# Patient Record
Sex: FEMALE | HISPANIC OR LATINO | Employment: FULL TIME | URBAN - METROPOLITAN AREA
[De-identification: names, ages, dates, MRNs, and addresses within clinical notes are randomized per-mention and may not be internally consistent; named-entity substitution may affect disease eponyms.]

---

## 2017-04-14 ENCOUNTER — HOSPITAL ENCOUNTER (OUTPATIENT)
Dept: RADIOLOGY | Facility: MEDICAL CENTER | Age: 62
End: 2017-04-14
Attending: FAMILY MEDICINE
Payer: COMMERCIAL

## 2017-04-14 DIAGNOSIS — Z12.31 SCREENING MAMMOGRAM, ENCOUNTER FOR: ICD-10-CM

## 2017-04-14 PROCEDURE — G0202 SCR MAMMO BI INCL CAD: HCPCS

## 2017-05-11 ENCOUNTER — HOSPITAL ENCOUNTER (OUTPATIENT)
Dept: RADIOLOGY | Facility: MEDICAL CENTER | Age: 62
End: 2017-05-11
Attending: FAMILY MEDICINE
Payer: COMMERCIAL

## 2017-05-11 DIAGNOSIS — R92.8 ABNORMAL MAMMOGRAM OF RIGHT BREAST: ICD-10-CM

## 2017-05-11 PROCEDURE — G0206 DX MAMMO INCL CAD UNI: HCPCS | Mod: RT

## 2020-03-29 ENCOUNTER — OFFICE VISIT (OUTPATIENT)
Dept: URGENT CARE | Facility: PHYSICIAN GROUP | Age: 65
End: 2020-03-29
Payer: COMMERCIAL

## 2020-03-29 VITALS
BODY MASS INDEX: 27.09 KG/M2 | OXYGEN SATURATION: 96 % | RESPIRATION RATE: 18 BRPM | HEIGHT: 60 IN | HEART RATE: 64 BPM | DIASTOLIC BLOOD PRESSURE: 70 MMHG | SYSTOLIC BLOOD PRESSURE: 112 MMHG | WEIGHT: 138 LBS | TEMPERATURE: 97.8 F

## 2020-03-29 DIAGNOSIS — M79.641 BILATERAL HAND PAIN: ICD-10-CM

## 2020-03-29 DIAGNOSIS — M79.642 BILATERAL HAND PAIN: ICD-10-CM

## 2020-03-29 DIAGNOSIS — D17.22 LIPOMA OF LEFT UPPER EXTREMITY: ICD-10-CM

## 2020-03-29 PROCEDURE — 99213 OFFICE O/P EST LOW 20 MIN: CPT | Performed by: NURSE PRACTITIONER

## 2020-03-29 ASSESSMENT — ENCOUNTER SYMPTOMS
MEMORY LOSS: 0
CHILLS: 0
SORE THROAT: 0
CONSTIPATION: 0
HEADACHES: 0
DIARRHEA: 0
NAUSEA: 0
BACK PAIN: 0
PALPITATIONS: 0
COUGH: 0
DIZZINESS: 0
SHORTNESS OF BREATH: 0
VOMITING: 0
ORTHOPNEA: 0
FEVER: 0
HEARTBURN: 0
WHEEZING: 0
MYALGIAS: 0
TINGLING: 0

## 2020-03-29 NOTE — LETTER
March 29, 2020       Patient: Sarina Ferreira   YOB: 1955   Date of Visit: 3/29/2020         To Whom It May Concern:    It is my medical opinion that Sarina Ferreira may be excused from work for the date of 3/29/2020.    If you have any questions or concerns, please don't hesitate to call [phone]          Sincerely,          Nadiya Mederos, TIANA.P.R.N.  Electronically Signed

## 2020-03-29 NOTE — PROGRESS NOTES
"Subjective:      Sarina Ferreira is a 64 y.o. female who presents with Hand Problem (both hands [id number], sometimes hurts into elbows & shoulders)            Patient comes in with c/o bilateral hand pain that began approximately one month ago.  She describes the pain as aching. It is usually mild but she reports it has been worsening over last week. She states the pain is 4/10 at its worst. It does radiate to her wrists and elbows as well as occasionally her shoulders. She works an [employer] in housekeeping.  She does not have known history of arthritis. She does report occasional weakness in her hands.  She denies any redness, swelling, fevers, weight loss. She has tried Aleve and this did provide mild improvement.  She does not have a PCP.      Patient also reports two large \"bumps\" on her left shoulder. These developed over last ten years.  Denies pain, redness or injury.       Review of Systems   Constitutional: Negative for chills and fever.   HENT: Negative for ear pain and sore throat.    Respiratory: Negative for cough, shortness of breath and wheezing.    Cardiovascular: Negative for chest pain, palpitations, orthopnea and leg swelling.   Gastrointestinal: Negative for constipation, diarrhea, heartburn, nausea and vomiting.   Musculoskeletal: Positive for joint pain. Negative for back pain and myalgias.   Skin: Negative for rash.        Two large lumps on left shoulder   Neurological: Negative for dizziness, tingling and headaches.   Psychiatric/Behavioral: Negative for memory loss and suicidal ideas.   All other systems reviewed and are negative.         Objective:     /70 (BP Location: Left arm, Patient Position: Sitting, BP Cuff Size: Adult)   Pulse 64   Temp 36.6 °C (97.8 °F) (Temporal)   Resp 18   Ht 1.524 m (5')   Wt 62.6 kg (138 lb)   SpO2 96%   BMI 26.95 kg/m²      Physical Exam  Vitals signs reviewed.   Constitutional:       General: She is not in acute distress.     " Appearance: Normal appearance.   HENT:      Head: Normocephalic and atraumatic.      Right Ear: External ear normal.      Left Ear: External ear normal.   Eyes:      Pupils: Pupils are equal, round, and reactive to light.   Neck:      Musculoskeletal: Normal range of motion and neck supple.   Cardiovascular:      Rate and Rhythm: Normal rate and regular rhythm.      Pulses: Normal pulses.      Heart sounds: No friction rub. No gallop.    Pulmonary:      Effort: Pulmonary effort is normal. No respiratory distress.      Breath sounds: Normal breath sounds.   Abdominal:      General: Bowel sounds are normal. There is no distension.      Palpations: Abdomen is soft. There is no mass.   Musculoskeletal: Normal range of motion.      Left shoulder: She exhibits swelling ( Two large freely moveable masses consistent with lipoma. No s/s of infection. ). She exhibits no tenderness.      Right hand: She exhibits tenderness. She exhibits normal range of motion, normal capillary refill, no deformity and no swelling. Normal sensation noted. Normal strength noted. She exhibits no thumb/finger opposition and no wrist extension trouble.      Left hand: She exhibits tenderness. She exhibits normal range of motion, no bony tenderness, normal capillary refill, no deformity and no laceration. Normal sensation noted. Normal strength noted.      Right lower leg: No edema.      Left lower leg: No edema.   Skin:     General: Skin is warm and dry.   Neurological:      Mental Status: She is alert and oriented to person, place, and time.   Psychiatric:         Mood and Affect: Mood normal.         Behavior: Behavior normal.                 Assessment/Plan:       1. Bilateral hand pain  Differential diagnosis, natural history, supportive care, and indications for immediate follow-up discussed at length.   Suspect chronic OA.  Patient will establish with a PCP at [facility].  Discussed OTC/ oral and topical pain medications      2. Lipoma of left  upper extremity  Discussed f/u with primary for removal if this becomes bothersome to patient. Patient verbalizes understanding.      Instructed to return to  or nearest emergency department if symptoms fail to improve, for any change in condition, further concerns, or new concerning symptoms.  Patient states understanding of the plan of care and discharge instructions.

## 2021-03-03 DIAGNOSIS — Z23 NEED FOR VACCINATION: ICD-10-CM
